# Patient Record
Sex: FEMALE | Race: BLACK OR AFRICAN AMERICAN | NOT HISPANIC OR LATINO | Employment: UNEMPLOYED | URBAN - NONMETROPOLITAN AREA
[De-identification: names, ages, dates, MRNs, and addresses within clinical notes are randomized per-mention and may not be internally consistent; named-entity substitution may affect disease eponyms.]

---

## 2020-02-11 ENCOUNTER — HOSPITAL ENCOUNTER (EMERGENCY)
Facility: HOSPITAL | Age: 35
Discharge: HOME | End: 2020-02-11
Attending: EMERGENCY MEDICINE | Admitting: EMERGENCY MEDICINE

## 2020-02-11 ENCOUNTER — APPOINTMENT (OUTPATIENT)
Dept: ULTRASOUND IMAGING | Facility: HOSPITAL | Age: 35
End: 2020-02-11

## 2020-02-11 ENCOUNTER — APPOINTMENT (OUTPATIENT)
Dept: GENERAL RADIOLOGY | Facility: HOSPITAL | Age: 35
End: 2020-02-11

## 2020-02-11 VITALS
BODY MASS INDEX: 36.1 KG/M2 | WEIGHT: 238.2 LBS | OXYGEN SATURATION: 99 % | HEART RATE: 77 BPM | TEMPERATURE: 98.1 F | RESPIRATION RATE: 18 BRPM | HEIGHT: 68 IN | SYSTOLIC BLOOD PRESSURE: 154 MMHG | DIASTOLIC BLOOD PRESSURE: 92 MMHG

## 2020-02-11 DIAGNOSIS — R10.32 LEFT LOWER QUADRANT ABDOMINAL PAIN: Primary | ICD-10-CM

## 2020-02-11 DIAGNOSIS — R60.0 LEG EDEMA, LEFT: ICD-10-CM

## 2020-02-11 LAB
ALBUMIN SERPL-MCNC: 3.6 G/DL (ref 3.5–5.2)
ALBUMIN/GLOB SERPL: 0.8 G/DL
ALP SERPL-CCNC: 64 U/L (ref 39–117)
ALT SERPL W P-5'-P-CCNC: 15 U/L (ref 1–33)
ANION GAP SERPL CALCULATED.3IONS-SCNC: 12 MMOL/L (ref 5–15)
AST SERPL-CCNC: 19 U/L (ref 1–32)
BASOPHILS # BLD AUTO: 0.06 10*3/MM3 (ref 0–0.2)
BASOPHILS NFR BLD AUTO: 0.6 % (ref 0–1.5)
BILIRUB SERPL-MCNC: 0.2 MG/DL (ref 0.2–1.2)
BILIRUB UR QL STRIP: NEGATIVE
BUN BLD-MCNC: 7 MG/DL (ref 6–20)
BUN/CREAT SERPL: 8.9 (ref 7–25)
CALCIUM SPEC-SCNC: 9.3 MG/DL (ref 8.6–10.5)
CHLORIDE SERPL-SCNC: 107 MMOL/L (ref 98–107)
CLARITY UR: CLEAR
CO2 SERPL-SCNC: 21 MMOL/L (ref 22–29)
COLOR UR: YELLOW
CREAT BLD-MCNC: 0.79 MG/DL (ref 0.57–1)
DEPRECATED RDW RBC AUTO: 46.9 FL (ref 37–54)
EOSINOPHIL # BLD AUTO: 0.09 10*3/MM3 (ref 0–0.4)
EOSINOPHIL NFR BLD AUTO: 1 % (ref 0.3–6.2)
ERYTHROCYTE [DISTWIDTH] IN BLOOD BY AUTOMATED COUNT: 13.7 % (ref 12.3–15.4)
GFR SERPL CREATININE-BSD FRML MDRD: 101 ML/MIN/1.73
GLOBULIN UR ELPH-MCNC: 4.7 GM/DL
GLUCOSE BLD-MCNC: 95 MG/DL (ref 65–99)
GLUCOSE UR STRIP-MCNC: NEGATIVE MG/DL
HCG SERPL QL: NEGATIVE
HCT VFR BLD AUTO: 31.8 % (ref 34–46.6)
HGB BLD-MCNC: 10.1 G/DL (ref 12–15.9)
HGB UR QL STRIP.AUTO: NEGATIVE
IMM GRANULOCYTES # BLD AUTO: 0.03 10*3/MM3 (ref 0–0.05)
IMM GRANULOCYTES NFR BLD AUTO: 0.3 % (ref 0–0.5)
KETONES UR QL STRIP: NEGATIVE
LEUKOCYTE ESTERASE UR QL STRIP.AUTO: NEGATIVE
LIPASE SERPL-CCNC: 43 U/L (ref 13–60)
LYMPHOCYTES # BLD AUTO: 1.54 10*3/MM3 (ref 0.7–3.1)
LYMPHOCYTES NFR BLD AUTO: 16.3 % (ref 19.6–45.3)
MCH RBC QN AUTO: 29.8 PG (ref 26.6–33)
MCHC RBC AUTO-ENTMCNC: 31.8 G/DL (ref 31.5–35.7)
MCV RBC AUTO: 93.8 FL (ref 79–97)
MONOCYTES # BLD AUTO: 0.53 10*3/MM3 (ref 0.1–0.9)
MONOCYTES NFR BLD AUTO: 5.6 % (ref 5–12)
NEUTROPHILS # BLD AUTO: 7.18 10*3/MM3 (ref 1.7–7)
NEUTROPHILS NFR BLD AUTO: 76.2 % (ref 42.7–76)
NITRITE UR QL STRIP: NEGATIVE
NRBC BLD AUTO-RTO: 0 /100 WBC (ref 0–0.2)
PH UR STRIP.AUTO: 6.5 [PH] (ref 5–9)
PLATELET # BLD AUTO: 416 10*3/MM3 (ref 140–450)
PMV BLD AUTO: 9.9 FL (ref 6–12)
POTASSIUM BLD-SCNC: 4 MMOL/L (ref 3.5–5.2)
PROT SERPL-MCNC: 8.3 G/DL (ref 6–8.5)
PROT UR QL STRIP: NEGATIVE
RBC # BLD AUTO: 3.39 10*6/MM3 (ref 3.77–5.28)
SODIUM BLD-SCNC: 140 MMOL/L (ref 136–145)
SP GR UR STRIP: 1.02 (ref 1–1.03)
UROBILINOGEN UR QL STRIP: NORMAL
WBC NRBC COR # BLD: 9.43 10*3/MM3 (ref 3.4–10.8)
WHOLE BLOOD HOLD SPECIMEN: NORMAL
WHOLE BLOOD HOLD SPECIMEN: NORMAL

## 2020-02-11 PROCEDURE — 25010000002 KETOROLAC TROMETHAMINE PER 15 MG: Performed by: EMERGENCY MEDICINE

## 2020-02-11 PROCEDURE — 87661 TRICHOMONAS VAGINALIS AMPLIF: CPT | Performed by: EMERGENCY MEDICINE

## 2020-02-11 PROCEDURE — 74022 RADEX COMPL AQT ABD SERIES: CPT

## 2020-02-11 PROCEDURE — 96374 THER/PROPH/DIAG INJ IV PUSH: CPT

## 2020-02-11 PROCEDURE — 99284 EMERGENCY DEPT VISIT MOD MDM: CPT

## 2020-02-11 PROCEDURE — 84703 CHORIONIC GONADOTROPIN ASSAY: CPT | Performed by: EMERGENCY MEDICINE

## 2020-02-11 PROCEDURE — 87491 CHLMYD TRACH DNA AMP PROBE: CPT | Performed by: EMERGENCY MEDICINE

## 2020-02-11 PROCEDURE — 83690 ASSAY OF LIPASE: CPT | Performed by: EMERGENCY MEDICINE

## 2020-02-11 PROCEDURE — 80053 COMPREHEN METABOLIC PANEL: CPT | Performed by: EMERGENCY MEDICINE

## 2020-02-11 PROCEDURE — 81003 URINALYSIS AUTO W/O SCOPE: CPT | Performed by: EMERGENCY MEDICINE

## 2020-02-11 PROCEDURE — 76830 TRANSVAGINAL US NON-OB: CPT

## 2020-02-11 PROCEDURE — 93971 EXTREMITY STUDY: CPT

## 2020-02-11 PROCEDURE — 85025 COMPLETE CBC W/AUTO DIFF WBC: CPT | Performed by: EMERGENCY MEDICINE

## 2020-02-11 PROCEDURE — 93976 VASCULAR STUDY: CPT

## 2020-02-11 PROCEDURE — 87591 N.GONORRHOEAE DNA AMP PROB: CPT | Performed by: EMERGENCY MEDICINE

## 2020-02-11 RX ORDER — KETOROLAC TROMETHAMINE 30 MG/ML
30 INJECTION, SOLUTION INTRAMUSCULAR; INTRAVENOUS ONCE
Status: COMPLETED | OUTPATIENT
Start: 2020-02-11 | End: 2020-02-11

## 2020-02-11 RX ORDER — SODIUM CHLORIDE 0.9 % (FLUSH) 0.9 %
10 SYRINGE (ML) INJECTION AS NEEDED
Status: DISCONTINUED | OUTPATIENT
Start: 2020-02-11 | End: 2020-02-12 | Stop reason: HOSPADM

## 2020-02-11 RX ORDER — POLYETHYLENE GLYCOL 3350 17 G/17G
17 POWDER, FOR SOLUTION ORAL DAILY PRN
Qty: 5 EACH | Refills: 0 | Status: SHIPPED | OUTPATIENT
Start: 2020-02-11

## 2020-02-11 RX ORDER — DICYCLOMINE HCL 20 MG
20 TABLET ORAL EVERY 6 HOURS PRN
Qty: 6 TABLET | Refills: 0 | Status: SHIPPED | OUTPATIENT
Start: 2020-02-11

## 2020-02-11 RX ADMIN — Medication 10 ML: at 21:36

## 2020-02-11 RX ADMIN — KETOROLAC TROMETHAMINE 30 MG: 30 INJECTION, SOLUTION INTRAMUSCULAR at 21:35

## 2020-02-12 ENCOUNTER — OFFICE VISIT (OUTPATIENT)
Dept: FAMILY MEDICINE CLINIC | Facility: CLINIC | Age: 35
End: 2020-02-12

## 2020-02-12 VITALS
SYSTOLIC BLOOD PRESSURE: 133 MMHG | HEART RATE: 84 BPM | RESPIRATION RATE: 20 BRPM | DIASTOLIC BLOOD PRESSURE: 88 MMHG | WEIGHT: 238 LBS | HEIGHT: 68 IN | TEMPERATURE: 98.2 F | OXYGEN SATURATION: 98 % | BODY MASS INDEX: 36.07 KG/M2

## 2020-02-12 DIAGNOSIS — Z76.89 ENCOUNTER TO ESTABLISH CARE: Primary | ICD-10-CM

## 2020-02-12 DIAGNOSIS — R10.84 GENERALIZED ABDOMINAL PAIN: ICD-10-CM

## 2020-02-12 DIAGNOSIS — M79.605 LEFT LEG PAIN: ICD-10-CM

## 2020-02-12 LAB
C TRACH RRNA CVX QL NAA+PROBE: NEGATIVE
N GONORRHOEA RRNA SPEC QL NAA+PROBE: NEGATIVE
TRICHOMONAS VAGINALIS PCR: NEGATIVE

## 2020-02-12 PROCEDURE — 99203 OFFICE O/P NEW LOW 30 MIN: CPT | Performed by: NURSE PRACTITIONER

## 2020-02-12 NOTE — ED PROVIDER NOTES
"Subjective   33yo female pmh significant htn, recently discharged [facility] secondary to LLE cellulitis, presents ED c/o 1d hx left sided pelvic pain/LLQ pain characterized as  \"sharp/stabbing\"/radiating thru to back/exac movement/neg relieve factors/neg assoc symptoms.  ROS neg fever/chills/cough/soa/dysuria/hematuria/vaginal discharge/vaginal bleeding/dyspareunia/n/v/d.      History provided by:  Patient  Abdominal Pain   Pain location:  LLQ  Pain quality: sharp    Pain radiates to:  Back  Pain severity:  Moderate  Onset quality:  Sudden  Duration:  1 day  Progression:  Waxing and waning  Associated symptoms: constipation    Associated symptoms: no diarrhea, no nausea and no vomiting        Review of Systems   Constitutional: Negative.    HENT: Negative.    Respiratory: Negative.    Cardiovascular: Negative.    Gastrointestinal: Positive for abdominal pain and constipation. Negative for blood in stool, diarrhea, nausea and vomiting.   Genitourinary: Positive for pelvic pain.   Musculoskeletal: Negative.    Skin: Negative.    All other systems reviewed and are negative.      Past Medical History:   Diagnosis Date   • Gestational hypertension        No Known Allergies    Past Surgical History:   Procedure Laterality Date   •  SECTION      x3       History reviewed. No pertinent family history.    Social History     Socioeconomic History   • Marital status:      Spouse name: Not on file   • Number of children: Not on file   • Years of education: Not on file   • Highest education level: Not on file   Tobacco Use   • Smoking status: Current Every Day Smoker     Packs/day: 0.50     Types: Cigarettes   • Smokeless tobacco: Never Used   Substance and Sexual Activity   • Alcohol use: Yes     Comment: occasionally   • Drug use: Yes     Types: Marijuana   • Sexual activity: Defer           Objective   Physical Exam   Constitutional: She is oriented to person, place, and time. She appears well-developed and " well-nourished.   HENT:   Head: Normocephalic and atraumatic.   Mouth/Throat: Oropharynx is clear and moist.   Eyes: Pupils are equal, round, and reactive to light.   Neck: Normal range of motion. Neck supple. No JVD present. No tracheal deviation present.   Cardiovascular: Normal rate, regular rhythm, normal heart sounds and intact distal pulses. Exam reveals no gallop and no friction rub.   No murmur heard.  Pulmonary/Chest: Effort normal and breath sounds normal. She has no wheezes. She has no rales.   Abdominal: Soft. Bowel sounds are normal. She exhibits no distension and no mass. There is no tenderness. There is no rigidity, no rebound, no guarding, no CVA tenderness, no tenderness at McBurney's point and negative Remy's sign.   Musculoskeletal: She exhibits edema.        Left lower leg: She exhibits tenderness, bony tenderness, swelling and edema. She exhibits no deformity and no laceration.        Legs:  Lymphadenopathy:     She has no cervical adenopathy.   Neurological: She is alert and oriented to person, place, and time.   Nursing note and vitals reviewed.      Procedures           ED Course  ED Course as of Feb 11 2320   Tue Feb 11, 2020   2316 Re eval abdomen: abdomen soft nontender. Neg r/r/g/hsm. Pt declined pelvic examination. Pt denies physical complaints at time of discharge.     [SD]      ED Course User Index  [SD] Prosper Artis MD      Labs Reviewed   COMPREHENSIVE METABOLIC PANEL - Abnormal; Notable for the following components:       Result Value    CO2 21.0 (*)     All other components within normal limits    Narrative:     GFR Normal >60  Chronic Kidney Disease <60  Kidney Failure <15     CBC WITH AUTO DIFFERENTIAL - Abnormal; Notable for the following components:    RBC 3.39 (*)     Hemoglobin 10.1 (*)     Hematocrit 31.8 (*)     Neutrophil % 76.2 (*)     Lymphocyte % 16.3 (*)     Neutrophils, Absolute 7.18 (*)     All other components within normal limits   LIPASE - Normal   URINALYSIS  W/ MICROSCOPIC IF INDICATED (NO CULTURE) - Normal    Narrative:     Urine microscopic not indicated.   HCG, SERUM, QUALITATIVE - Normal   SPARKLE ALBICANS, GARDNERELLA VAGINALIS, TRICHOMONAS VAGINALIS,DNA   CHLAMYDIA TRACHOMATIS, NEISSERIA GONORRHOEAE, TRICHOMONAS VAGINALIS, PCR   CBC AND DIFFERENTIAL    Narrative:     The following orders were created for panel order CBC & Differential.  Procedure                               Abnormality         Status                     ---------                               -----------         ------                     CBC Auto Differential[[id number]]        Abnormal            Final result                 Please view results for these tests on the individual orders.   EXTRA TUBES    Narrative:     The following orders were created for panel order Extra Tubes.  Procedure                               Abnormality         Status                     ---------                               -----------         ------                     Light Blue Top[[id number]]                                   Final result               Lavender Top[[id number]]                                     Final result                 Please view results for these tests on the individual orders.   LIGHT BLUE TOP   LAVENDER TOP     Xr Abdomen 2+ Vw With Chest 1 Vw    Result Date: 2/11/2020  Narrative: EXAM DESCRIPTION: XR ABDOMEN 2+ VIEWS W CHEST 1 VW CLINICAL HISTORY: abd pain TECHNIQUE: Two views of the abdomen and frontal view of the chest are submitted. COMPARISON: None available for comparison FINDINGS: Heart: The cardiac silhouette is within normal limits. Lungs: No focal consolidation. Mediastinum: Unremarkable Pleura: Unremarkable Bowel: Gas is seen throughout the large bowel to the level of the rectum. Moderate stool. No dilation. Calcifications: None Bones: Intact Other: Bilateral pelvis tubal ligation clips.     Impression: Nonobstructive bowel gas pattern. Moderate stool. Electronically signed  by:  Arvind Padron MD  2/11/2020 9:43 PM CST Workstation: [id number]    Us Non-ob Transvaginal    Result Date: 2/11/2020  Narrative: Ultrasound pelvis transvaginal with color duplex exam on 2/11/2020 CLINICAL INDICATION: Pelvic pain COMPARISON: None FINDINGS: Multiple sonographic images are obtained throughout the pelvis by transvaginal approach, both transverse and sagittal images are obtained. Color flow, gray scale and spectral analysis are all performed. Uterus measures approximately 7.8 x 4.5 x 4.7 cm. The right ovary measures approximately 2.1 x 1.7 x 4.0 cm. Flow is demonstrated in the right ovary. The left ovary measures approximately 2.9 x 1.3 x 3.2 cm. Flow is demonstrated in the left ovary. No adnexal mass or fluid collection is noted. Uterine myometrium appears homogeneous. No free fluid is noted.     Impression: Unremarkable exam. Electronically signed by:  Nasir De La Torre  2/11/2020 9:22 PM CST Workstation: [id number]    Us Testicular Or Ovarian Vascular Limited    Result Date: 2/11/2020  Narrative: Ultrasound pelvis transvaginal with color duplex exam on 2/11/2020 CLINICAL INDICATION: Pelvic pain COMPARISON: None FINDINGS: Multiple sonographic images are obtained throughout the pelvis by transvaginal approach, both transverse and sagittal images are obtained. Color flow, gray scale and spectral analysis are all performed. Uterus measures approximately 7.8 x 4.5 x 4.7 cm. The right ovary measures approximately 2.1 x 1.7 x 4.0 cm. Flow is demonstrated in the right ovary. The left ovary measures approximately 2.9 x 1.3 x 3.2 cm. Flow is demonstrated in the left ovary. No adnexal mass or fluid collection is noted. Uterine myometrium appears homogeneous. No free fluid is noted.     Impression: Unremarkable exam. Electronically signed by:  Nasir De La Torer  2/11/2020 9:22 PM CST Workstation: [id number]    Us Venous Doppler Lower Extremity Left (duplex)    Result Date: 2/11/2020  Narrative: Ultrasound left lower  extremity venous duplex exam on 2/11/2020 CLINICAL INDICATION: Left leg edema COMPARISON: None FINDINGS: Multiple sonographic images are obtained from the left groin through the left calf vessels. Color flow, compression and spectral analysis are all performed. There is complete compressibility of the veins of the lower extremity. Good color flow is identified within the veins of the lower extremity.     Impression: No evidence of deep venous thrombus. Electronically signed by:  Nasir De La Torre  2/11/2020 9:20 PM CST Workstation: [id number]                                         [facility]  Number of Diagnoses or Management Options  Left lower quadrant abdominal pain: new and requires workup  Leg edema, left:   Diagnosis management comments: Benign abdominal exam. AAS nonobstructive. Pelvic US negative acute finding. No evidence torsion.  Pt denies vaginal discharge/vaginal bleeding; pt deferred pelvic exam at this time.  Pt reports LLE chronic edema x4-5 months duration. No evidence dvt/cellulitis. Pedal pulses intact. Labs unremarkable. Pt denies physical complaints at time of discharge; pt has pmd appt 02.12.2020.  Will arrange outpatient f/u with vascular clinic for eval LLE edema.       Amount and/or Complexity of Data Reviewed  Clinical lab tests: reviewed  Tests in the radiology section of CPT®: reviewed        Final diagnoses:   Left lower quadrant abdominal pain   Leg edema, left            Prosper Artis MD  02/11/20 [id number]

## 2020-02-12 NOTE — ED NOTES
Pt presents to ED with C/O LLQ abdominal pain that radiates to her back that began this AM upon waking.      Joan Lo RN  02/11/20 1945

## 2020-02-14 ENCOUNTER — OFFICE VISIT (OUTPATIENT)
Dept: CARDIAC SURGERY | Facility: CLINIC | Age: 35
End: 2020-02-14

## 2020-02-14 VITALS
HEART RATE: 77 BPM | DIASTOLIC BLOOD PRESSURE: 83 MMHG | WEIGHT: 238 LBS | SYSTOLIC BLOOD PRESSURE: 140 MMHG | BODY MASS INDEX: 36.07 KG/M2 | HEIGHT: 68 IN | OXYGEN SATURATION: 99 %

## 2020-02-14 DIAGNOSIS — M79.89 PAIN AND SWELLING OF LEFT LOWER EXTREMITY: Primary | ICD-10-CM

## 2020-02-14 DIAGNOSIS — M79.605 PAIN AND SWELLING OF LEFT LOWER EXTREMITY: Primary | ICD-10-CM

## 2020-02-14 DIAGNOSIS — E66.9 OBESITY (BMI 30-39.9): ICD-10-CM

## 2020-02-14 PROCEDURE — 99204 OFFICE O/P NEW MOD 45 MIN: CPT | Performed by: NURSE PRACTITIONER

## 2020-02-14 NOTE — PROGRESS NOTES
Chief Complaint   Patient presents with   • Edema     left leg   • Abdominal Pain   • Establish Care       Subjective:  Fadumo Payton is a 34 y.o. female who presents to [facility] and hospital f/u for abdominal pain and left leg swelling/pain. Pt reports being hospitalized for several days where imaging and labs performed. Pt also reports being on several antibiotics to treat cellulitis of her left leg. Pt reports pain/swelling of leg did improve but is now worsening again. Pt reports dx with gallstones during hospital stay and was the source of her abdominal pain and nausea. Pts main c/o today is the persistent L leg pain and swelling.      The following portions of the patient's history were reviewed and updated as appropriate: allergies, current medications, past family history, past medical history, past social history, past surgical history and problem list.    Abdominal Pain   This is a new problem. The current episode started 1 to 4 weeks ago. The onset quality is undetermined. The problem occurs daily. The problem has been gradually worsening. The pain is located in the generalized abdominal region. The quality of the pain is sharp. The abdominal pain radiates to the RUQ. Associated symptoms include anorexia, arthralgias, a fever, myalgias and nausea. Pertinent negatives include no constipation, diarrhea, dysuria or headaches. Prior diagnostic workup includes CT scan.   Leg Pain    The incident occurred more than 1 week ago. There was no injury mechanism. The pain is present in the left leg. The quality of the pain is described as aching. The pain is at a severity of 5/10. The pain is moderate. The pain has been worsening since onset. Associated symptoms include an inability to bear weight. The symptoms are aggravated by movement and weight bearing. She has tried non-weight bearing and rest for the symptoms. The treatment provided no relief.        Past Medical History:   Diagnosis Date   •  "Gestational hypertension          Current Outpatient Medications:   •  acetaminophen (TYLENOL) 500 MG tablet, Take 2 tablets by mouth Every 6 (Six) Hours As Needed for Mild Pain  or Moderate Pain ., Disp: 20 tablet, Rfl: 0  •  dicyclomine (BENTYL) 20 MG tablet, Take 1 tablet by mouth Every 6 (Six) Hours As Needed (abdominal cramping)., Disp: 6 tablet, Rfl: 0  •  polyethylene glycol (MIRALAX) packet, Take 17 g by mouth Daily As Needed (constipation)., Disp: 5 each, Rfl: 0    Review of Systems    Review of Systems   Constitutional: Positive for fever.   Respiratory: Negative for chest tightness.    Cardiovascular: Positive for leg swelling. Negative for chest pain.   Gastrointestinal: Positive for abdominal pain, anorexia and nausea. Negative for constipation and diarrhea.   Genitourinary: Negative for dysuria.   Musculoskeletal: Positive for arthralgias and myalgias.   Neurological: Negative for dizziness and headaches.       Objective  Vitals:    02/12/20 1310   BP: 133/88   BP Location: Right arm   Patient Position: Sitting   Cuff Size: Adult   Pulse: 84   Resp: 20   Temp: 98.2 °F (36.8 °C)   TempSrc: Tympanic   SpO2: 98%   Weight: 108 kg (238 lb)   Height: 172.7 cm (68\")   PainSc:   9   PainLoc: Leg       Physical Exam   Constitutional: She is oriented to person, place, and time. She appears well-developed and well-nourished.   Ambulating via wheelchair   HENT:   Head: Normocephalic and atraumatic.   Eyes: Pupils are equal, round, and reactive to light.   Neck: Neck supple.   Cardiovascular: Normal rate, regular rhythm and normal heart sounds. Exam reveals decreased pulses (DP and PT pulses not palpable but faint with doppler).   Pulmonary/Chest: Effort normal and breath sounds normal.   Abdominal: Soft. There is tenderness.   Musculoskeletal: She exhibits edema and tenderness.        Left lower leg: She exhibits tenderness, swelling and edema.        Legs:  Left lower leg appears swollen, erythematous and ttp "   Neurological: She is alert and oriented to person, place, and time.   Skin: There is erythema.   Psychiatric: She has a normal mood and affect. Her behavior is normal.   Nursing note and vitals reviewed.        Fadumo was seen today for edema, abdominal pain and establish care.    Diagnoses and all orders for this visit:    Encounter to establish care    Left leg pain  -     acetaminophen (TYLENOL) 500 MG tablet; Take 2 tablets by mouth Every 6 (Six) Hours As Needed for Mild Pain  or Moderate Pain .    Generalized abdominal pain  -     acetaminophen (TYLENOL) 500 MG tablet; Take 2 tablets by mouth Every 6 (Six) Hours As Needed for Mild Pain  or Moderate Pain .    1. Has appt with vascular on 2/14/2020 for further evaluation of LLE pain/swelling  2. Hospital records reviewed - multiple courses of abx and imaging with infectious disease consulted.  3. Will wait for specialty appts before proceeding with possible tx.  4. Evidence of gallstones per hospital records, which would most likely be cause of abd pain/nausea - Dr. Martel is seeing for this.  5. Samples of extra strength tylenol provided for pain control.  6. Advised to return to ER for severe worsening of sx otherwise f/u with vascular as scheduled.  7. RTC 2 weeks or PRN      This document has been electronically signed by CLAUS Carpenter on February 17, 2020 8:29 AM

## 2020-02-14 NOTE — PATIENT INSTRUCTIONS
Negative LEFT lower extremity for DVT  Compression stockings  Elevate leg ankle higher than hip 4x daily  Proceed with CT abdomen/pelvis as scheduled  Follow up for results

## 2020-02-17 ENCOUNTER — TELEPHONE (OUTPATIENT)
Dept: FAMILY MEDICINE CLINIC | Facility: CLINIC | Age: 35
End: 2020-02-17

## 2020-02-17 PROBLEM — E66.9 OBESITY (BMI 30-39.9): Status: ACTIVE | Noted: 2020-02-17

## 2020-02-17 RX ORDER — ACETAMINOPHEN 500 MG
1000 TABLET ORAL EVERY 6 HOURS PRN
Qty: 20 TABLET | Refills: 0 | COMMUNITY
Start: 2020-02-17

## 2020-02-17 NOTE — TELEPHONE ENCOUNTER
Radha with [facility] requested a call at [phone] to discuss Home Services. Radha would like to know if Fadumo needs home health services and if so she would like a call for a verbal order.

## 2020-02-17 NOTE — PROGRESS NOTES
CVTS Office Progress Note       Subjective   Patient ID: Fadumo Payton is a 34 y.o. female is being seen for consultation today at the request of Jenn DEVLIN    Chief Complaint:    Chief Complaint   Patient presents with   • Leg Swelling   • Leg Pain       The following portions of the patient's history were reviewed and updated as appropriate: allergies, current medications, past family history, past medical history, past social history, past surgical history and problem list.  Recent images independently reviewed.  Available laboratory values reviewed.    PCP:  Jenn Bansal APRN    34 y.o. female with HTN(stable, increased risk stroke, rupture), Obesity(uncontrolled, increased risk cardiovascular events) and Smoker(uncontrolled, increased risk cardiovascular events)  Cellulitis LLE, treated..  smokes 1/2 PPD.  Progressive leg swelling/pain LLE. Negative DVT. Wears compression stocking. Now with difficult ambulation.  No TIA stroke amaurosis.  No MI claudication. No other associated signs, symptoms or modifying factors.    2020: LE venous duplex ([facility]): Negative DVT  2020: Echo ([facility]): EF 60%. Trace MR/TR LV size WNL    Past Medical History:   Diagnosis Date   • Gestational hypertension      Past Surgical History:   Procedure Laterality Date   •  SECTION      x3     Family History   Problem Relation Age of Onset   • Hypertension Mother    • Diabetes Mother    • Heart disease Sister    • Heart disease Brother      Social History     Tobacco Use   • Smoking status: Current Every Day Smoker     Packs/day: 0.50     Types: Cigarettes   • Smokeless tobacco: Never Used   Substance Use Topics   • Alcohol use: Yes     Comment: occasionally   • Drug use: Yes     Types: Marijuana       ALLERGIES:   Patient has no known allergies.    MEDICATIONS:      Current Outpatient Medications:   •  acetaminophen (TYLENOL) 500 MG tablet, Take 2 tablets by mouth Every 6 (Six) Hours As Needed for Mild Pain   or Moderate Pain ., Disp: 20 tablet, Rfl: 0  •  dicyclomine (BENTYL) 20 MG tablet, Take 1 tablet by mouth Every 6 (Six) Hours As Needed (abdominal cramping)., Disp: 6 tablet, Rfl: 0  •  polyethylene glycol (MIRALAX) packet, Take 17 g by mouth Daily As Needed (constipation)., Disp: 5 each, Rfl: 0    Review of Systems   Eyes: Negative for visual disturbance.   Cardiovascular: Positive for leg swelling. Negative for claudication and cyanosis.   Respiratory: Negative for shortness of breath.    Hematologic/Lymphatic: Does not bruise/bleed easily.   Skin: Negative for color change and nail changes.   Musculoskeletal: Negative for muscle weakness.   Gastrointestinal: Negative for dysphagia.   Neurological: Negative for focal weakness, light-headedness, loss of balance, numbness, paresthesias and weakness.   Psychiatric/Behavioral: Negative for altered mental status.   All other systems reviewed and are negative.       Objective       Vitals:    02/14/20 1440   BP: 140/83   Pulse: 77   SpO2: 99%      Body mass index is 36.19 kg/m².  Physical Exam   Constitutional: She is oriented to person, place, and time. She appears well-nourished.   HENT:   Head: Atraumatic.   Eyes: EOM are normal.   Neck: Neck supple. Carotid bruit is not present.   Cardiovascular: Normal rate and normal heart sounds.   Pulses:       Dorsalis pedis pulses are 2+ on the right side, and 2+ on the left side.        Posterior tibial pulses are 2+ on the right side, and 2+ on the left side.   Pulmonary/Chest: Effort normal and breath sounds normal.   Abdominal: Soft. Bowel sounds are normal.   Musculoskeletal: She exhibits edema (LLE 2+).   Difficulty with ambulation/pain   Neurological: She is alert and oriented to person, place, and time.   Skin: Skin is warm and dry. Capillary refill takes less than 2 seconds.   Psychiatric: She has a normal mood and affect. Thought content normal.   Vitals reviewed.        Assessment & Plan     Independent Review of  Radiographic Studies:  Detailed discussion regarding risks, benefits, and treatment plan. Images independently reviewed. Patient understands, agrees, and wishes to proceed with plan.     1. Pain and swelling of left lower extremity  Negative LEFT lower extremity for DVT  Compression stockings  Elevate leg ankle higher than hip 4x daily  Proceed with CT abdomen/pelvis as scheduled  R/O Iliac Vein Compression Syndrome  Follow up for results  - CT Abdomen Pelvis With Contrast; Future    2. Obesity (BMI 30-39.9)  Obesity Class 2. BMI 36. Options for weight management, heart healthy diet, exercise programs, and associated health risks of obesity discussed. Information given. Questions answered. Referrals made: No     Time spent with patient 40 out of 40 min face to face evaluating, treating, and discussing findings regarding vascular exam. Coordination of CT abdomen/pelvis and education to patient and family regarding treatment options, plan of care, and hoped for outcomes.                This document has been electronically signed by CLAUS Bhatia on February 17, 2020 2:57 PM

## 2020-02-25 NOTE — TELEPHONE ENCOUNTER
I have only seen this patient once but she may benefit from short term home health or at least an evaluation from home health. The pt did not mention that she wanted HH when I saw her. Would you please contact [facility] to inquire what they need or contact pt to see if she wants HH involved, then we would most likely initiate referral to [facility] unless pt requests otherwise.

## 2020-02-26 ENCOUNTER — OFFICE VISIT (OUTPATIENT)
Dept: FAMILY MEDICINE CLINIC | Facility: CLINIC | Age: 35
End: 2020-02-26

## 2020-02-26 VITALS
BODY MASS INDEX: 37.13 KG/M2 | DIASTOLIC BLOOD PRESSURE: 86 MMHG | TEMPERATURE: 97.5 F | HEART RATE: 88 BPM | RESPIRATION RATE: 20 BRPM | OXYGEN SATURATION: 98 % | WEIGHT: 245 LBS | SYSTOLIC BLOOD PRESSURE: 134 MMHG | HEIGHT: 68 IN

## 2020-02-26 DIAGNOSIS — Z23 ENCOUNTER FOR IMMUNIZATION: ICD-10-CM

## 2020-02-26 DIAGNOSIS — M79.605 PAIN AND SWELLING OF LEFT LOWER EXTREMITY: Primary | ICD-10-CM

## 2020-02-26 DIAGNOSIS — M79.89 PAIN AND SWELLING OF LEFT LOWER EXTREMITY: Primary | ICD-10-CM

## 2020-02-26 DIAGNOSIS — K62.5 RECTAL BLEEDING: ICD-10-CM

## 2020-02-26 DIAGNOSIS — K64.9 HEMORRHOIDS, UNSPECIFIED HEMORRHOID TYPE: ICD-10-CM

## 2020-02-26 PROCEDURE — 90471 IMMUNIZATION ADMIN: CPT | Performed by: NURSE PRACTITIONER

## 2020-02-26 PROCEDURE — 99213 OFFICE O/P EST LOW 20 MIN: CPT | Performed by: NURSE PRACTITIONER

## 2020-02-26 PROCEDURE — 90715 TDAP VACCINE 7 YRS/> IM: CPT | Performed by: NURSE PRACTITIONER

## 2020-02-26 RX ORDER — HYDROCORTISONE ACETATE 25 MG/1
25 SUPPOSITORY RECTAL 2 TIMES DAILY PRN
Qty: 24 SUPPOSITORY | Refills: 1 | Status: SHIPPED | OUTPATIENT
Start: 2020-02-26

## 2020-02-26 NOTE — PROGRESS NOTES
Chief Complaint   Patient presents with   • Leg Swelling     2 week f/u       Subjective:  Fadumo Payton is a 34 y.o. female who presents for f/u for left leg swelling. Pt reports pain and swelling have much improved. Reports has seen vascular and has CT scheduled for 2-28 and f/u appt with vascular on 3-2-2020. Is wearing compression stockings today.       The following portions of the patient's history were reviewed and updated as appropriate: allergies, current medications, past family history, past medical history, past social history, past surgical history and problem list.    Leg Swelling   This is a recurrent problem. The current episode started more than 1 month ago. The problem occurs daily. The problem has been gradually improving. Associated symptoms include myalgias. Pertinent negatives include no chest pain or headaches. The symptoms are aggravated by standing and walking. She has tried rest (compression stocking) for the symptoms. The treatment provided moderate relief.        Past Medical History:   Diagnosis Date   • Gestational hypertension          Current Outpatient Medications:   •  acetaminophen (TYLENOL) 500 MG tablet, Take 2 tablets by mouth Every 6 (Six) Hours As Needed for Mild Pain  or Moderate Pain ., Disp: 20 tablet, Rfl: 0  •  dicyclomine (BENTYL) 20 MG tablet, Take 1 tablet by mouth Every 6 (Six) Hours As Needed (abdominal cramping)., Disp: 6 tablet, Rfl: 0  •  hydrocortisone (ANUSOL-HC) 25 MG suppository, Insert 1 suppository into the rectum 2 (Two) Times a Day As Needed for Hemorrhoids., Disp: 24 suppository, Rfl: 1  •  polyethylene glycol (MIRALAX) packet, Take 17 g by mouth Daily As Needed (constipation)., Disp: 5 each, Rfl: 0    Review of Systems    Review of Systems   Respiratory: Negative for shortness of breath.    Cardiovascular: Positive for leg swelling. Negative for chest pain and palpitations.   Gastrointestinal: Positive for blood in stool and constipation.  "  Musculoskeletal: Positive for myalgias.   Skin: Positive for color change.   Neurological: Negative for dizziness, syncope and headaches.   Hematological: Does not bruise/bleed easily.       Objective  Vitals:    02/26/20 0757   BP: 134/86   BP Location: Right arm   Patient Position: Sitting   Cuff Size: Adult   Pulse: 88   Resp: 20   Temp: 97.5 °F (36.4 °C)   TempSrc: Tympanic   SpO2: 98%   Weight: 111 kg (245 lb)   Height: 172.7 cm (68\")   PainSc: 0-No pain       Physical Exam   Constitutional: She is oriented to person, place, and time. She appears well-developed and well-nourished.   HENT:   Head: Atraumatic.   Eyes: Pupils are equal, round, and reactive to light.   Neck: Normal range of motion.   Cardiovascular: Normal rate, regular rhythm and normal heart sounds.   Pulmonary/Chest: Effort normal and breath sounds normal.   Genitourinary:   Genitourinary Comments: Exam declined; pt reports external hemorrhoids present   Musculoskeletal: She exhibits edema (LLE).   L calf 19\", R calf 17-3/4\"; compression stocking on L leg   Neurological: She is alert and oriented to person, place, and time.   Skin: Skin is warm and dry.   Psychiatric: She has a normal mood and affect. Her behavior is normal.   Nursing note and vitals reviewed.        Fadumo was seen today for leg swelling.    Diagnoses and all orders for this visit:    Pain and swelling of left lower extremity    Hemorrhoids, unspecified hemorrhoid type  -     Ambulatory Referral to Gastroenterology  -     hydrocortisone (ANUSOL-HC) 25 MG suppository; Insert 1 suppository into the rectum 2 (Two) Times a Day As Needed for Hemorrhoids.    Rectal bleeding  -     Ambulatory Referral to Gastroenterology    Encounter for immunization  -     Tdap Vaccine Greater Than or Equal To 6yo IM    1. Pain and swelling of LLE much improved. Pt able to ambulate unassisted today.  2. Keep CT appt on 2- and f/u with vascular on 3-2-2020  3. Discussed need for Tdap, pt " agreeable to receiving today.  4. Will refer to GI for w/u for hemorrhoids with bleeding eval.  5. Discussed f/u for wellness exam and PAP at earliest convenience or PRN.      This document has been electronically signed by CLAUS Carpenter on February 26, 2020 11:20 AM

## 2020-02-28 ENCOUNTER — HOSPITAL ENCOUNTER (OUTPATIENT)
Dept: CT IMAGING | Facility: HOSPITAL | Age: 35
Discharge: HOME | End: 2020-02-28
Admitting: NURSE PRACTITIONER

## 2020-02-28 DIAGNOSIS — M79.605 PAIN AND SWELLING OF LEFT LOWER EXTREMITY: ICD-10-CM

## 2020-02-28 DIAGNOSIS — M79.89 PAIN AND SWELLING OF LEFT LOWER EXTREMITY: ICD-10-CM

## 2020-02-28 PROCEDURE — 74177 CT ABD & PELVIS W/CONTRAST: CPT

## 2020-02-28 PROCEDURE — 25010000002 IOPAMIDOL 61 % SOLUTION: Performed by: NURSE PRACTITIONER

## 2020-02-28 RX ADMIN — IOPAMIDOL 90 ML: 612 INJECTION, SOLUTION INTRAVENOUS at 12:22

## 2020-03-03 ENCOUNTER — OFFICE VISIT (OUTPATIENT)
Dept: CARDIAC SURGERY | Facility: CLINIC | Age: 35
End: 2020-03-03

## 2020-03-03 VITALS
WEIGHT: 245.8 LBS | SYSTOLIC BLOOD PRESSURE: 150 MMHG | HEART RATE: 83 BPM | HEIGHT: 68 IN | DIASTOLIC BLOOD PRESSURE: 80 MMHG | BODY MASS INDEX: 37.25 KG/M2 | OXYGEN SATURATION: 100 %

## 2020-03-03 DIAGNOSIS — I89.0 LYMPHEDEMA: ICD-10-CM

## 2020-03-03 DIAGNOSIS — M79.605 PAIN AND SWELLING OF LEFT LOWER EXTREMITY: Primary | ICD-10-CM

## 2020-03-03 DIAGNOSIS — E66.9 OBESITY (BMI 30-39.9): ICD-10-CM

## 2020-03-03 DIAGNOSIS — M79.89 PAIN AND SWELLING OF LEFT LOWER EXTREMITY: Primary | ICD-10-CM

## 2020-03-03 PROCEDURE — 99213 OFFICE O/P EST LOW 20 MIN: CPT | Performed by: NURSE PRACTITIONER

## 2020-03-03 NOTE — PATIENT INSTRUCTIONS
Ct scan negative.      Continue daily compression.      Follow up 3 months for recheck.      Possible primary lymphedema, if failed compression therapy will consider for at home leg pumps.

## 2020-03-03 NOTE — PROGRESS NOTES
CVTS Office Progress Note       Subjective   Patient ID: Fadumo Payton is a 34 y.o. female is being seen for consultation today at the request of Jenn DEVLIN    Chief Complaint:    Chief Complaint   Patient presents with   • Follow-up       The following portions of the patient's history were reviewed and updated as appropriate: allergies, current medications, past family history, past medical history, past social history, past surgical history and problem list.  Recent images independently reviewed.  Available laboratory values reviewed.    PCP:  Jenn Bansal APRN    34 y.o. female with HTN(stable, increased risk stroke, rupture), Obesity(uncontrolled, increased risk cardiovascular events) and Smoker(uncontrolled, increased risk cardiovascular events)  Cellulitis LLE, treated..  smokes 1/2 PPD.  Progressive leg swelling/pain LLE. Negative DVT. Wears compression stocking.  Symptoms have improved with the use of thigh high compression.  No TIA stroke amaurosis.  No MI claudication. No other associated signs, symptoms or modifying factors.    2020: LE venous duplex ([facility]): Negative DVT  2020: Echo ([facility]): EF 60%. Trace MR/TR LV size WNL  3/2020 CT Abd/Pelv Contrast: Negative for L iliac vein compression, slight enlargement in inguinal lymph nodes      Past Medical History:   Diagnosis Date   • Gestational hypertension      Past Surgical History:   Procedure Laterality Date   •  SECTION      x3     Family History   Problem Relation Age of Onset   • Hypertension Mother    • Diabetes Mother    • Heart disease Sister    • Heart disease Brother      Social History     Tobacco Use   • Smoking status: Current Every Day Smoker     Packs/day: 0.50     Types: Cigarettes   • Smokeless tobacco: Never Used   Substance Use Topics   • Alcohol use: Yes     Comment: occasionally   • Drug use: Yes     Types: Marijuana       ALLERGIES:   Patient has no known allergies.    MEDICATIONS:      Current  Outpatient Medications:   •  acetaminophen (TYLENOL) 500 MG tablet, Take 2 tablets by mouth Every 6 (Six) Hours As Needed for Mild Pain  or Moderate Pain ., Disp: 20 tablet, Rfl: 0  •  dicyclomine (BENTYL) 20 MG tablet, Take 1 tablet by mouth Every 6 (Six) Hours As Needed (abdominal cramping)., Disp: 6 tablet, Rfl: 0  •  hydrocortisone (ANUSOL-HC) 25 MG suppository, Insert 1 suppository into the rectum 2 (Two) Times a Day As Needed for Hemorrhoids., Disp: 24 suppository, Rfl: 1  •  polyethylene glycol (MIRALAX) packet, Take 17 g by mouth Daily As Needed (constipation)., Disp: 5 each, Rfl: 0    Review of Systems   Eyes: Negative for visual disturbance.   Cardiovascular: Positive for leg swelling. Negative for claudication and cyanosis.   Respiratory: Negative for shortness of breath.    Hematologic/Lymphatic: Does not bruise/bleed easily.   Skin: Negative for color change and nail changes.   Musculoskeletal: Negative for muscle weakness.   Gastrointestinal: Negative for dysphagia.   Neurological: Negative for focal weakness, light-headedness, loss of balance, numbness, paresthesias and weakness.   Psychiatric/Behavioral: Negative for altered mental status.   All other systems reviewed and are negative.       Objective   Heart Rate:  [83] 83  BP: (150)/(80) 150/80   Vitals:    03/03/20 1135   BP: 150/80   Pulse: 83   SpO2: 100%      Body mass index is 37.37 kg/m².  Physical Exam   Constitutional: She is oriented to person, place, and time. She appears well-nourished.   HENT:   Head: Atraumatic.   Eyes: EOM are normal.   Neck: Neck supple. Carotid bruit is not present.   Cardiovascular: Normal rate and normal heart sounds.   Pulses:       Dorsalis pedis pulses are 2+ on the right side, and 2+ on the left side.        Posterior tibial pulses are 2+ on the right side, and 2+ on the left side.   Pulmonary/Chest: Effort normal and breath sounds normal.   Abdominal: Soft. Bowel sounds are normal.   Musculoskeletal: She  exhibits edema (LLE edema improved).   Difficulty with ambulation/pain   Neurological: She is alert and oriented to person, place, and time.   Skin: Skin is warm and dry. Capillary refill takes less than 2 seconds.   Psychiatric: She has a normal mood and affect. Thought content normal.   Vitals reviewed.        Assessment & Plan     Independent Review of Radiographic Studies:   3/2020 CT Abd/Pelv Contrast: Negative for L iliac vein compression    1. Pain and swelling of left lower extremity  Substantial improvement with thigh high compression.      Extensive work up negative for DVT, negative for May Thurner's syndrome.      2. Obesity (BMI 30-39.9)  We have discussed the benefits of weight loss as it relates to long term morbidity and disease prevention.  Interventions discussed included self-monitoring of caloric intake, increasing physical activity/exercise, goal setting, stimulus control, consultation with dietitian, and non-food rewards.      3. Lymphedema  Extensive work up negative for DVT, negative for iliac vein compression.  Unilateral edema, which is now suspicious for possible primary lymphedema.  Responding well to compression.   Patient wishes to continue with conservative management    Recheck 3 months.  Thigh high stockings given to patient.    Consider pneumatic compression if thigh high compression fails.             This document has been electronically signed by CLAUS Chaparro on March 3, 2020 12:35 PM